# Patient Record
Sex: FEMALE | Race: OTHER | HISPANIC OR LATINO
[De-identification: names, ages, dates, MRNs, and addresses within clinical notes are randomized per-mention and may not be internally consistent; named-entity substitution may affect disease eponyms.]

---

## 2017-01-17 ENCOUNTER — APPOINTMENT (OUTPATIENT)
Dept: ULTRASOUND IMAGING | Facility: CLINIC | Age: 37
End: 2017-01-17

## 2017-01-17 ENCOUNTER — OUTPATIENT (OUTPATIENT)
Dept: OUTPATIENT SERVICES | Facility: HOSPITAL | Age: 37
LOS: 1 days | End: 2017-01-17
Payer: COMMERCIAL

## 2017-01-17 DIAGNOSIS — N91.5 OLIGOMENORRHEA, UNSPECIFIED: ICD-10-CM

## 2017-01-17 PROCEDURE — 76856 US EXAM PELVIC COMPLETE: CPT

## 2017-01-17 PROCEDURE — 76830 TRANSVAGINAL US NON-OB: CPT

## 2017-02-03 ENCOUNTER — APPOINTMENT (OUTPATIENT)
Dept: PLASTIC SURGERY | Facility: CLINIC | Age: 37
End: 2017-02-03

## 2017-02-03 DIAGNOSIS — M65.4 RADIAL STYLOID TENOSYNOVITIS [DE QUERVAIN]: ICD-10-CM

## 2017-04-12 PROBLEM — M65.4 TENOSYNOVITIS, DE QUERVAIN: Status: ACTIVE | Noted: 2017-04-12

## 2017-06-14 ENCOUNTER — RESULT REVIEW (OUTPATIENT)
Age: 37
End: 2017-06-14

## 2018-03-13 ENCOUNTER — APPOINTMENT (OUTPATIENT)
Dept: HUMAN REPRODUCTION | Facility: CLINIC | Age: 38
End: 2018-03-13
Payer: COMMERCIAL

## 2018-03-13 PROCEDURE — 76830 TRANSVAGINAL US NON-OB: CPT

## 2018-03-13 PROCEDURE — 36415 COLL VENOUS BLD VENIPUNCTURE: CPT

## 2018-03-13 PROCEDURE — 99204 OFFICE O/P NEW MOD 45 MIN: CPT

## 2018-04-12 ENCOUNTER — APPOINTMENT (OUTPATIENT)
Dept: HUMAN REPRODUCTION | Facility: CLINIC | Age: 38
End: 2018-04-12
Payer: COMMERCIAL

## 2018-04-12 PROCEDURE — 99214 OFFICE O/P EST MOD 30 MIN: CPT

## 2018-04-12 PROCEDURE — 36415 COLL VENOUS BLD VENIPUNCTURE: CPT

## 2018-05-03 ENCOUNTER — APPOINTMENT (OUTPATIENT)
Dept: HUMAN REPRODUCTION | Facility: CLINIC | Age: 38
End: 2018-05-03
Payer: COMMERCIAL

## 2018-05-03 PROCEDURE — 99215 OFFICE O/P EST HI 40 MIN: CPT

## 2020-02-14 ENCOUNTER — APPOINTMENT (OUTPATIENT)
Dept: ORTHOPEDIC SURGERY | Facility: CLINIC | Age: 40
End: 2020-02-14
Payer: COMMERCIAL

## 2020-02-14 VITALS
DIASTOLIC BLOOD PRESSURE: 79 MMHG | SYSTOLIC BLOOD PRESSURE: 114 MMHG | HEIGHT: 63 IN | WEIGHT: 150 LBS | HEART RATE: 71 BPM | BODY MASS INDEX: 26.58 KG/M2

## 2020-02-14 DIAGNOSIS — G56.02 CARPAL TUNNEL SYNDROME, LEFT UPPER LIMB: ICD-10-CM

## 2020-02-14 DIAGNOSIS — Z86.39 PERSONAL HISTORY OF OTHER ENDOCRINE, NUTRITIONAL AND METABOLIC DISEASE: ICD-10-CM

## 2020-02-14 DIAGNOSIS — Z87.09 PERSONAL HISTORY OF OTHER DISEASES OF THE RESPIRATORY SYSTEM: ICD-10-CM

## 2020-02-14 DIAGNOSIS — G56.01 CARPAL TUNNEL SYNDROME, RIGHT UPPER LIMB: ICD-10-CM

## 2020-02-14 PROCEDURE — 99203 OFFICE O/P NEW LOW 30 MIN: CPT

## 2020-02-14 RX ORDER — THYROID, PORCINE 15 MG/1
15 TABLET ORAL
Qty: 30 | Refills: 0 | Status: DISCONTINUED | COMMUNITY
Start: 2017-02-01 | End: 2020-02-14

## 2020-02-14 RX ORDER — MEDROXYPROGESTERONE ACETATE 10 MG/1
10 TABLET ORAL
Qty: 10 | Refills: 0 | Status: DISCONTINUED | COMMUNITY
Start: 2017-01-10 | End: 2020-02-14

## 2020-02-14 NOTE — HISTORY OF PRESENT ILLNESS
[Right] : right hand dominant [FreeTextEntry1] : She comes in today for evaluation of bilateral wrist to elbow pain. She is more symptomatic on the right over the left. She states that her bilateral pain began 1 year ago. She states that the pain is intermittent and 7/10. She notes numbness and tingling. She also notes that she is more symptomatic at night. She has had 2 cortisone injections done after her pregnancy in 2016 for de Quervain's tendinitis. She states no known injury. As for the left, she notes that her pain is 4/10 with numbness and tingling. She has had no injections on the left and no known injury. \par \par

## 2020-02-14 NOTE — ADDENDUM
[FreeTextEntry1] : I, Maryam Moreland, acted solely as a scribe for Dr. Ramesh Melgoza on 02/14/2020 . \par \par All medical record entries made by the Scribe were at my, Dr. Ramesh Melgoza, direction and personally dictated by me on 02/14/2020. I have personally reviewed the chart and agree that the record accurately reflects my personal performance of the history, physical exam, assessment and plan.

## 2020-02-14 NOTE — PHYSICAL EXAM
[de-identified] : - Constitutional: This is a female in no obvious distress.  \par - Psych: Patient is alert and oriented to person, place and time.  Patient has a normal mood and affect.\par - Cardiovascular: Normal pulses throughout the upper extremities.  No significant varicosities are noted in the upper extremities. \par - Neuro: Strength and sensation are intact throughout the upper extremities.  Patient has normal coordination.\par - Respiratory:  Patient exhibits no evidence of shortness of breath or difficulty breathing.\par - Skin: No rashes, lesions, or other abnormalities are noted in the upper extremities.\par \par ---\par \par Examination of her right wrist and hand demonstrates no swelling or tenderness along the first dorsal compartment.  There is a negative Finkelstein sign.  Provocative signs for carpal tunnel syndrome are positive.  There is no evidence of a trigger finger.  She has normal strength and sensation distally along the radial, ulnar and median nerve distributions.

## 2020-02-14 NOTE — DISCUSSION/SUMMARY
[FreeTextEntry1] : She has findings consistent with right greater than left carpal tunnel syndrome. \par \par I had a discussion regarding today's visit, the diagnosis, and treatment recommendations /  At this time, I recommended that she continue to wear the brace for support, especially at night.  I have ordered EMGs and she will follow-up after the study to review the results and discuss treatment recommendations.\par \par The patient has agreed to this plan of management and has expressed full understanding.  All questions were fully answered to the patient's satisfaction.\par \par Over 50% of the time spent with the patient was on counseling the patient on the above diagnosis, treatment plan and prognosis.

## 2020-02-20 ENCOUNTER — APPOINTMENT (OUTPATIENT)
Dept: PLASTIC SURGERY | Facility: CLINIC | Age: 40
End: 2020-02-20

## 2020-12-27 ENCOUNTER — TRANSCRIPTION ENCOUNTER (OUTPATIENT)
Age: 40
End: 2020-12-27

## 2021-05-24 ENCOUNTER — TRANSCRIPTION ENCOUNTER (OUTPATIENT)
Age: 41
End: 2021-05-24

## 2021-08-28 ENCOUNTER — TRANSCRIPTION ENCOUNTER (OUTPATIENT)
Age: 41
End: 2021-08-28

## 2021-09-01 ENCOUNTER — TRANSCRIPTION ENCOUNTER (OUTPATIENT)
Age: 41
End: 2021-09-01

## 2021-11-14 ENCOUNTER — TRANSCRIPTION ENCOUNTER (OUTPATIENT)
Age: 41
End: 2021-11-14

## 2021-11-16 ENCOUNTER — APPOINTMENT (OUTPATIENT)
Dept: DERMATOLOGY | Facility: CLINIC | Age: 41
End: 2021-11-16
Payer: COMMERCIAL

## 2021-11-16 ENCOUNTER — TRANSCRIPTION ENCOUNTER (OUTPATIENT)
Age: 41
End: 2021-11-16

## 2021-11-16 ENCOUNTER — LABORATORY RESULT (OUTPATIENT)
Age: 41
End: 2021-11-16

## 2021-11-16 VITALS — HEIGHT: 61 IN | BODY MASS INDEX: 26.43 KG/M2 | WEIGHT: 140 LBS

## 2021-11-16 DIAGNOSIS — R22.9 LOCALIZED SWELLING, MASS AND LUMP, UNSPECIFIED: ICD-10-CM

## 2021-11-16 DIAGNOSIS — D48.5 NEOPLASM OF UNCERTAIN BEHAVIOR OF SKIN: ICD-10-CM

## 2021-11-16 DIAGNOSIS — D49.2 NEOPLASM OF UNSPECIFIED BEHAVIOR OF BONE, SOFT TISSUE, AND SKIN: ICD-10-CM

## 2021-11-16 PROCEDURE — 99203 OFFICE O/P NEW LOW 30 MIN: CPT | Mod: 25

## 2021-11-16 PROCEDURE — 11104 PUNCH BX SKIN SINGLE LESION: CPT

## 2021-11-16 RX ORDER — CLOBETASOL PROPIONATE 0.5 MG/G
0.05 OINTMENT TOPICAL
Qty: 1 | Refills: 0 | Status: DISCONTINUED | COMMUNITY
Start: 2021-11-16 | End: 2021-11-16

## 2021-11-23 ENCOUNTER — RESULT REVIEW (OUTPATIENT)
Age: 41
End: 2021-11-23

## 2021-11-30 ENCOUNTER — APPOINTMENT (OUTPATIENT)
Dept: DERMATOLOGY | Facility: CLINIC | Age: 41
End: 2021-11-30
Payer: COMMERCIAL

## 2021-11-30 PROCEDURE — 99214 OFFICE O/P EST MOD 30 MIN: CPT

## 2021-12-01 ENCOUNTER — LABORATORY RESULT (OUTPATIENT)
Age: 41
End: 2021-12-01

## 2021-12-07 ENCOUNTER — NON-APPOINTMENT (OUTPATIENT)
Age: 41
End: 2021-12-07

## 2021-12-20 ENCOUNTER — OUTPATIENT (OUTPATIENT)
Dept: OUTPATIENT SERVICES | Facility: HOSPITAL | Age: 41
LOS: 1 days | Discharge: HOME | End: 2021-12-20

## 2021-12-20 DIAGNOSIS — C85.90 NON-HODGKIN LYMPHOMA, UNSPECIFIED, UNSPECIFIED SITE: ICD-10-CM

## 2021-12-21 ENCOUNTER — APPOINTMENT (OUTPATIENT)
Dept: HEMATOLOGY ONCOLOGY | Facility: CLINIC | Age: 41
End: 2021-12-21
Payer: COMMERCIAL

## 2021-12-21 ENCOUNTER — RESULT REVIEW (OUTPATIENT)
Age: 41
End: 2021-12-21

## 2021-12-21 ENCOUNTER — NON-APPOINTMENT (OUTPATIENT)
Age: 41
End: 2021-12-21

## 2021-12-21 VITALS
WEIGHT: 143.96 LBS | TEMPERATURE: 98.6 F | OXYGEN SATURATION: 99 % | RESPIRATION RATE: 16 BRPM | DIASTOLIC BLOOD PRESSURE: 80 MMHG | SYSTOLIC BLOOD PRESSURE: 109 MMHG | BODY MASS INDEX: 27.18 KG/M2 | HEIGHT: 61 IN | HEART RATE: 73 BPM

## 2021-12-21 DIAGNOSIS — Z78.9 OTHER SPECIFIED HEALTH STATUS: ICD-10-CM

## 2021-12-21 DIAGNOSIS — J45.20 MILD INTERMITTENT ASTHMA, UNCOMPLICATED: ICD-10-CM

## 2021-12-21 DIAGNOSIS — Z82.69 FAMILY HISTORY OF OTHER DISEASES OF THE MUSCULOSKELETAL SYSTEM AND CONNECTIVE TISSUE: ICD-10-CM

## 2021-12-21 DIAGNOSIS — C88.4 EXTRANODAL MARGINAL ZONE B-CELL LYMPHOMA OF MUCOSA-ASSOCIATED LYMPHOID TISSUE [MALT-LYMPHOMA]: ICD-10-CM

## 2021-12-21 DIAGNOSIS — E78.5 HYPERLIPIDEMIA, UNSPECIFIED: ICD-10-CM

## 2021-12-21 LAB
BASOPHILS # BLD AUTO: 0.08 K/UL — SIGNIFICANT CHANGE UP (ref 0–0.2)
BASOPHILS NFR BLD AUTO: 1.5 % — SIGNIFICANT CHANGE UP (ref 0–2)
EOSINOPHIL # BLD AUTO: 0.18 K/UL — SIGNIFICANT CHANGE UP (ref 0–0.5)
EOSINOPHIL NFR BLD AUTO: 3.4 % — SIGNIFICANT CHANGE UP (ref 0–6)
HCT VFR BLD CALC: 36.9 % — SIGNIFICANT CHANGE UP (ref 34.5–45)
HGB BLD-MCNC: 12.7 G/DL — SIGNIFICANT CHANGE UP (ref 11.5–15.5)
IMM GRANULOCYTES NFR BLD AUTO: 0.2 % — SIGNIFICANT CHANGE UP (ref 0–1.5)
LYMPHOCYTES # BLD AUTO: 2.51 K/UL — SIGNIFICANT CHANGE UP (ref 1–3.3)
LYMPHOCYTES # BLD AUTO: 46.7 % — HIGH (ref 13–44)
MCHC RBC-ENTMCNC: 30.4 PG — SIGNIFICANT CHANGE UP (ref 27–34)
MCHC RBC-ENTMCNC: 34.4 G/DL — SIGNIFICANT CHANGE UP (ref 32–36)
MCV RBC AUTO: 88.3 FL — SIGNIFICANT CHANGE UP (ref 80–100)
MONOCYTES # BLD AUTO: 0.36 K/UL — SIGNIFICANT CHANGE UP (ref 0–0.9)
MONOCYTES NFR BLD AUTO: 6.7 % — SIGNIFICANT CHANGE UP (ref 2–14)
NEUTROPHILS # BLD AUTO: 2.23 K/UL — SIGNIFICANT CHANGE UP (ref 1.8–7.4)
NEUTROPHILS NFR BLD AUTO: 41.5 % — LOW (ref 43–77)
NRBC # BLD: 0 /100 WBCS — SIGNIFICANT CHANGE UP (ref 0–0)
PLATELET # BLD AUTO: 183 K/UL — SIGNIFICANT CHANGE UP (ref 150–400)
RBC # BLD: 4.18 M/UL — SIGNIFICANT CHANGE UP (ref 3.8–5.2)
RBC # FLD: 12.7 % — SIGNIFICANT CHANGE UP (ref 10.3–14.5)
WBC # BLD: 5.37 K/UL — SIGNIFICANT CHANGE UP (ref 3.8–10.5)
WBC # FLD AUTO: 5.37 K/UL — SIGNIFICANT CHANGE UP (ref 3.8–10.5)

## 2021-12-21 PROCEDURE — 99245 OFF/OP CONSLTJ NEW/EST HI 55: CPT

## 2021-12-25 RX ORDER — FLUTICASONE FUROATE AND VILANTEROL TRIFENATATE 100; 25 UG/1; UG/1
100-25 POWDER RESPIRATORY (INHALATION)
Qty: 60 | Refills: 0 | Status: ACTIVE | COMMUNITY
Start: 2020-10-21

## 2021-12-25 RX ORDER — DESOGESTREL AND ETHINYL ESTRADIOL 0.15-0.03
0.15-3 KIT ORAL
Qty: 28 | Refills: 0 | Status: ACTIVE | COMMUNITY
Start: 2021-12-13

## 2021-12-25 NOTE — PHYSICAL EXAM
[Fully active, able to carry on all pre-disease performance without restriction] : Status 0 - Fully active, able to carry on all pre-disease performance without restriction [Normal] : affect appropriate [de-identified] : Darker, raised, oval nodule, less than 1 cm in diameter on left upper back, notched at site of punch biopsy

## 2021-12-25 NOTE — REVIEW OF SYSTEMS
[SOB on Exertion] : shortness of breath during exertion [Negative] : Allergic/Immunologic [Cough] : no cough [FreeTextEntry6] : Asthma [FreeTextEntry8] : Premature menopause, as above [de-identified] : as above

## 2021-12-25 NOTE — ASSESSMENT
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  The lesion, by history, has partially regressed over time.\par She has been appropriately referred to have the lesion fully excised.\par Extent of disease evaluation will be done to include a PET/CT scan and blood work.\par She has already had blood work which included a normal CMP (minimal ALT elevation), LDH and normal SPEP, negative IgG and IgM Lyme immunoblot results.\par Viral serologies were drawn today along with a beta-2 microglobulin and immunoelectrophoresis.\par CBC today showed a WBC of 5.37, Hgb 12.7, PLT 183K,  lymphocyte percentage was slightly higher than neutrophil percentage while the absolute counts were normal.  Because of this, peripheral blood flow cytometry will be checked.  If this work-up is negative along with the PET/CT scan, the planned excision will serve as her definitive therapy.  She will then need regular dermatology follow-up and should return for follow-up here in about 6 months.\par She had an uncomplicated course of Covid earlier this year.  She declines to be vaccinated.  Covid spike antigen antibody will be checked. [Palliative Care Plan] : not applicable at this time

## 2021-12-25 NOTE — HISTORY OF PRESENT ILLNESS
[Disease:__________________________] : Disease: [unfilled] [de-identified] : Ms. Long is a 40 yo woman with recently diagnosed cutaneous marginal zone lymphoma.  She has a history of asthma, hyperlipidemia and premature menopause.  About 6 months ago, she noticed a bump on her left upper back. Biopsy showed marginal zone lymphoma.  \par She then saw Dr. Cl Maria and a second biopsy confirmed the diagnosis; there was insufficient tissue for Ig gene rearrangement  but prior bx \par showed clonal IgH rearrangements, neg for TCR rearr.  Kappa+ plasma cells were seen; Ki-67 was about 10%.\par She says that the lump on her back has regressed somewhat since she first felt it.  She has no other suspicious skin findings.\par She has had no constitutional symptoms.  [de-identified] : ? IE [de-identified] : initial visit

## 2021-12-27 DIAGNOSIS — C85.80 OTHER SPECIFIED TYPES OF NON-HODGKIN LYMPHOMA, UNSPECIFIED SITE: ICD-10-CM

## 2021-12-29 LAB
B2 MICROGLOB SERPL-MCNC: 1.1 MG/L
COVID-19 SPIKE DOMAIN ANTIBODY INTERPRETATION: POSITIVE
HBV CORE IGG+IGM SER QL: NONREACTIVE
HBV SURFACE AB SER QL: NONREACTIVE
HBV SURFACE AG SER QL: NONREACTIVE
HCV AB SER QL: NONREACTIVE
HCV S/CO RATIO: 0.08 S/CO
HIV1+2 AB SPEC QL IA.RAPID: NONREACTIVE
SARS-COV-2 AB SERPL IA-ACNC: >250 U/ML

## 2022-01-21 ENCOUNTER — APPOINTMENT (OUTPATIENT)
Dept: DERMATOLOGY | Facility: CLINIC | Age: 42
End: 2022-01-21

## 2022-01-23 ENCOUNTER — TRANSCRIPTION ENCOUNTER (OUTPATIENT)
Age: 42
End: 2022-01-23

## 2022-04-06 LAB
ALBUMIN MFR SERPL ELPH: 59.7 %
ALBUMIN SERPL-MCNC: 4.1 G/DL
ALBUMIN/GLOB SERPL: 1.5 RATIO
ALPHA1 GLOB MFR SERPL ELPH: 4.1 %
ALPHA1 GLOB SERPL ELPH-MCNC: 0.3 G/DL
ALPHA2 GLOB MFR SERPL ELPH: 10.5 %
ALPHA2 GLOB SERPL ELPH-MCNC: 0.7 G/DL
B-GLOBULIN MFR SERPL ELPH: 12.3 %
B-GLOBULIN SERPL ELPH-MCNC: 0.8 G/DL
DEPRECATED KAPPA LC FREE/LAMBDA SER: 1.12 RATIO
GAMMA GLOB FLD ELPH-MCNC: 0.9 G/DL
GAMMA GLOB MFR SERPL ELPH: 13.4 %
IGA SER QL IEP: 231 MG/DL
IGG SER QL IEP: 921 MG/DL
IGM SER QL IEP: 134 MG/DL
INTERPRETATION SERPL IEP-IMP: NORMAL
KAPPA LC CSF-MCNC: 0.94 MG/DL
KAPPA LC SERPL-MCNC: 1.05 MG/DL
M PROTEIN SPEC IFE-MCNC: NORMAL
PROT SERPL-MCNC: 6.8 G/DL
PROT SERPL-MCNC: 6.8 G/DL

## 2022-05-09 ENCOUNTER — NON-APPOINTMENT (OUTPATIENT)
Age: 42
End: 2022-05-09

## 2022-09-06 ENCOUNTER — NON-APPOINTMENT (OUTPATIENT)
Age: 42
End: 2022-09-06

## 2023-06-24 ENCOUNTER — NON-APPOINTMENT (OUTPATIENT)
Age: 43
End: 2023-06-24

## 2023-07-03 ENCOUNTER — APPOINTMENT (OUTPATIENT)
Dept: ENDOCRINOLOGY | Facility: CLINIC | Age: 43
End: 2023-07-03
Payer: COMMERCIAL

## 2023-07-03 VITALS
HEART RATE: 127 BPM | BODY MASS INDEX: 27.48 KG/M2 | HEIGHT: 60 IN | OXYGEN SATURATION: 98 % | DIASTOLIC BLOOD PRESSURE: 82 MMHG | WEIGHT: 140 LBS | SYSTOLIC BLOOD PRESSURE: 110 MMHG

## 2023-07-03 DIAGNOSIS — R79.89 OTHER SPECIFIED ABNORMAL FINDINGS OF BLOOD CHEMISTRY: ICD-10-CM

## 2023-07-03 DIAGNOSIS — E28.319 ASYMPTOMATIC PREMATURE MENOPAUSE: ICD-10-CM

## 2023-07-03 PROCEDURE — 99204 OFFICE O/P NEW MOD 45 MIN: CPT

## 2023-07-05 PROBLEM — E28.319 PREMATURE MENOPAUSE: Status: RESOLVED | Noted: 2021-12-21 | Resolved: 2023-07-05

## 2023-07-05 RX ORDER — ESTRADIOL 2 MG/1
2 TABLET ORAL
Qty: 60 | Refills: 0 | Status: DISCONTINUED | COMMUNITY
Start: 2021-03-22 | End: 2023-07-05

## 2023-07-05 RX ORDER — ROSUVASTATIN CALCIUM 10 MG/1
10 TABLET, FILM COATED ORAL DAILY
Refills: 0 | Status: DISCONTINUED | COMMUNITY
Start: 2021-12-21 | End: 2023-07-05

## 2023-07-05 NOTE — HISTORY OF PRESENT ILLNESS
[FreeTextEntry1] : 43 yo F with PMH of cutaneous marginal zone lymphoma, asthma, POI here for endocrine evaluation.\par \par POI diagnosed at age 36. Was seeing [facility] previously was trying to have another baby, was told no eggs. Has not seen endocrinology before.\par One year ago was still seeing [facility]. No longer planning for further children.\par Prior hot flashes which have resolved.\par Gets vaginal dryness. \par No postpartum hemorrhage.  baby after birth for 2 months, did not produce much milk, menses resumed ~4-6 months after Never on ocp \par Nonsmoker\par No CAD, VTE, CVA  in self or family \par Prior labs not available for POI diagnosis.\par \par Current regimen:  none\par Previously was taking PO estradiol/progesterone until 1 year ago while trying to get pregnant.\par \par [place] born in [place]. No trouble conceiving him. .\par \par Menarche: 13\par Regular \par LMP 2016\par \par Has never had DXA. No hx of osteoporosis or fracture.\par \par current taking calcium/vitamin D.\par Not on any other meds at this time.\par \par Sister also with hx of POI around 36-38 yo (also has SLE)\par No other endocrine disorders in family.\par

## 2023-07-05 NOTE — ASSESSMENT
[FreeTextEntry1] : 41 yo F with PMH of POI here for endocrine evaluation.\par \par #secondary amenorrhea/premature ovarian insufficiency\par - initial labs not available from prior diagnosis. Will check baseline labs including LH/FSH/estradiol along with a full pituitary panel (TFTs, PRL)\par - check hcg \par - check fragile x, chromosomal analysis, adrenal cortex ab\par - following labs, will plan to start HRT with estradiol patches + progesterone monthly. Discussed that this is not a form of contraception should spontaneous ovulation occur\par - sister also with POI, will consider genetics referral\par - check DXA\par \par #vitamin D deficiency\par - check level\par - continue supplementation\par \par RTC 6 months

## 2023-07-23 ENCOUNTER — NON-APPOINTMENT (OUTPATIENT)
Age: 43
End: 2023-07-23

## 2023-08-24 ENCOUNTER — NON-APPOINTMENT (OUTPATIENT)
Age: 43
End: 2023-08-24

## 2023-08-25 ENCOUNTER — TRANSCRIPTION ENCOUNTER (OUTPATIENT)
Age: 43
End: 2023-08-25

## 2023-08-25 LAB
25(OH)D3 SERPL-MCNC: 32.5 NG/ML
ACTH-ESO: 9.1 PG/ML
ADRENAL AB SER-ACNC: NEGATIVE
CORTIS SERPL-MCNC: 8.4 UG/DL
DHEA-SULFATE, SERUM: 82 UG/DL
ESTRADIOL SERPL-MCNC: <5 PG/ML
FMR1 GENE MUT ANL BLD/T: NORMAL
FSH SERPL-MCNC: 90 IU/L
HCG SERPL-MCNC: 1 MIU/ML
IGF-1 INTERP: NORMAL
IGF-I BLD-MCNC: 180 NG/ML
LH SERPL-ACNC: 45.9 IU/L
MONOMERIC PROLACTIN (ICMA)*: 8.56 NG/ML
OVARY AB SER QL IF: NEGATIVE
PERCENT MACROPROLACTIN: 19 %
PROLACTIN SERPL-MCNC: 9.7 NG/ML
PROLACTIN, SERUM (ICMA)*: 10.6 NG/ML
T4 FREE SERPL-MCNC: 1.4 NG/DL
THYROGLOB AB SERPL-ACNC: <20 IU/ML
THYROPEROXIDASE AB SERPL IA-ACNC: <10 IU/ML
TSH SERPL-ACNC: 0.8 UIU/ML

## 2024-02-28 ENCOUNTER — APPOINTMENT (OUTPATIENT)
Dept: ENDOCRINOLOGY | Facility: CLINIC | Age: 44
End: 2024-02-28

## 2024-02-28 ENCOUNTER — APPOINTMENT (OUTPATIENT)
Dept: ENDOCRINOLOGY | Facility: CLINIC | Age: 44
End: 2024-02-28
Payer: COMMERCIAL

## 2024-02-28 VITALS — WEIGHT: 161 LBS | BODY MASS INDEX: 28.89 KG/M2 | HEIGHT: 62.5 IN

## 2024-02-28 VITALS — OXYGEN SATURATION: 97 % | HEART RATE: 100 BPM | DIASTOLIC BLOOD PRESSURE: 70 MMHG | SYSTOLIC BLOOD PRESSURE: 100 MMHG

## 2024-02-28 PROCEDURE — 99214 OFFICE O/P EST MOD 30 MIN: CPT

## 2024-02-28 PROCEDURE — 77085 DXA BONE DENSITY AXL VRT FX: CPT

## 2024-02-28 PROCEDURE — ZZZZZ: CPT

## 2024-02-28 RX ORDER — PROGESTERONE 200 MG/1
200 CAPSULE ORAL
Qty: 36 | Refills: 2 | Status: ACTIVE | COMMUNITY
Start: 2023-07-19 | End: 1900-01-01

## 2024-02-28 RX ORDER — ESTRADIOL 0.1 MG/D
0.1 PATCH, EXTENDED RELEASE TRANSDERMAL
Qty: 24 | Refills: 2 | Status: ACTIVE | COMMUNITY
Start: 2023-07-19 | End: 1900-01-01

## 2024-02-28 NOTE — HISTORY OF PRESENT ILLNESS
[FreeTextEntry1] : 42 yo F with PMH of cutaneous marginal zone lymphoma, asthma, POI here for endocrine evaluation.  POI diagnosed at age 36. Was seeing [facility] previously was trying to have another baby, was told no eggs. Has not seen endocrinology before. One year ago was still seeing [facility]. No longer planning for further children. Not on contraceptives. Prior hot flashes which have resolved. Gets vaginal dryness. Reports vaginal dryness is better since starting HRT. No postpartum hemorrhage.  baby after birth for 2 months, did not produce much milk, menses resumed ~4-6 months after Never on ocp  Nonsmoker No CAD, VTE, CVA  in self or family  Prior labs not available for POI diagnosis. Recent labs (2023) show low estradiol and high FSH/LH, c/w POI.  Current regimen: Estradiol 0.1 mg/24 hr patch twice weekly + progesterone 200 mg daily for first 12 days of each month (started this regimen in 2023). Tolerating well. Previously was taking PO estradiol/progesterone until 1 year ago while trying to get pregnant.  Son born in [place]. No trouble conceiving him. .  Menarche: 13 Regular  LMP 2016 Reports since starting E2 patch + progesterone, has been getting monthly periods. LMP 2024.  No prior DXA. No hx of osteoporosis or fracture. Baseline DXA today (2024): LS -0.6, total hip 0.3, fem neck 0.1, prox radius -0.1.   Currently taking calcium/vitamin D daily. Also on rosuvastatin 10 mg daily for HLD.  Sister also with hx of POI around 36-38 yo (also has SLE).  No other endocrine disorders in family.

## 2024-02-28 NOTE — ASSESSMENT
[FreeTextEntry1] : 42 yo F with PMH of POI here for endocrine evaluation.  #secondary amenorrhea/premature ovarian insufficiency - initial labs not available from prior diagnosis.  - 7/2023 labs show low estradiol and high FSH/LH, c/w POI. Rest of pituitary panel and adrenal cortex ab wnl. - Continue transdermal E2 0.1 mg patch changed twice a week and progesterone 200mg x 12 days/month (most likely until around age 50). Discussed that this is not a form of contraception should spontaneous ovulation occur. - fragile x testing --> grey zone/indeterminate, though it is not the genetic basis of your premature ovarian insufficiency per prior chart notes. Refer to genetics (along with sister) per Dr. Lemons, phone number for genetics given again today. -Baseline DXA today (2/28/2024): LS -0.6, total hip 0.3, fem neck 0.1, prox radius -0.1. Normal DXA.   #vitamin D deficiency - continue supplementation  Recent outside labs reviewed today. Copy emailed to [email or web address]. RTC 6 months with Dr. Lemons.  Beatriz Del Real "Luigi Reyes NP

## 2024-02-28 NOTE — PHYSICAL EXAM
[Alert] : alert [Well Nourished] : well nourished [No Acute Distress] : no acute distress [Well Developed] : well developed [Normal Sclera/Conjunctiva] : normal sclera/conjunctiva [EOMI] : extra ocular movement intact [No Proptosis] : no proptosis [Thyroid Not Enlarged] : the thyroid was not enlarged [No Thyroid Nodules] : no palpable thyroid nodules [No Respiratory Distress] : no respiratory distress [No Accessory Muscle Use] : no accessory muscle use [Clear to Auscultation] : lungs were clear to auscultation bilaterally [Normal S1, S2] : normal S1 and S2 [Normal Rate] : heart rate was normal [Regular Rhythm] : with a regular rhythm [No Edema] : no peripheral edema [Pedal Pulses Normal] : the pedal pulses are present [Normal Bowel Sounds] : normal bowel sounds [Not Tender] : non-tender [Not Distended] : not distended [Soft] : abdomen soft [Normal Anterior Cervical Nodes] : no anterior cervical lymphadenopathy [No Spinal Tenderness] : no spinal tenderness [Spine Straight] : spine straight [No Stigmata of Cushings Syndrome] : no stigmata of Cushings Syndrome [Normal Gait] : normal gait [Normal Strength/Tone] : muscle strength and tone were normal [No Rash] : no rash [Normal Reflexes] : deep tendon reflexes were 2+ and symmetric [No Tremors] : no tremors [Oriented x3] : oriented to person, place, and time [Acanthosis Nigricans] : no acanthosis nigricans

## 2024-03-07 ENCOUNTER — APPOINTMENT (OUTPATIENT)
Age: 44
End: 2024-03-07

## 2024-04-20 ENCOUNTER — NON-APPOINTMENT (OUTPATIENT)
Age: 44
End: 2024-04-20

## 2024-05-23 ENCOUNTER — APPOINTMENT (OUTPATIENT)
Dept: PEDIATRIC MEDICAL GENETICS | Facility: TELEHEALTH | Age: 44
End: 2024-05-23
Payer: COMMERCIAL

## 2024-05-23 ENCOUNTER — APPOINTMENT (OUTPATIENT)
Dept: PEDIATRIC MEDICAL GENETICS | Facility: TELEHEALTH | Age: 44
End: 2024-05-23

## 2024-05-23 ENCOUNTER — APPOINTMENT (OUTPATIENT)
Age: 44
End: 2024-05-23

## 2024-05-23 DIAGNOSIS — Z83.49 FAMILY HISTORY OF OTHER ENDOCRINE, NUTRITIONAL AND METABOLIC DISEASES: ICD-10-CM

## 2024-05-23 DIAGNOSIS — E28.39 OTHER PRIMARY OVARIAN FAILURE: ICD-10-CM

## 2024-05-23 DIAGNOSIS — N91.1 SECONDARY AMENORRHEA: ICD-10-CM

## 2024-05-23 PROCEDURE — 99205 OFFICE O/P NEW HI 60 MIN: CPT | Mod: 95

## 2024-05-28 NOTE — CONSULT LETTER
[Dear  ___] : Dear  [unfilled], [Consult Letter:] : I had the pleasure of evaluating your patient, [unfilled]. [Please see my note below.] : Please see my note below. [Consult Closing:] : Thank you very much for allowing me to participate in the care of this patient.  If you have any questions, please do not hesitate to contact me. [Sincerely,] : Sincerely, [FreeTextEntry3] :  Ashley Gutierrez MD, PhD Clinical  [facility], Division of Medical Genetics and Human Genomics

## 2024-05-28 NOTE — FAMILY HISTORY
[FreeTextEntry1] : Mrs. Long is of [place] descent.  Her sister also had premature ovarian failure, going into menopause between ages 36-38. Her sister has two healthy daughters, ages 16 and 18. Her mother is in good health and was menopausal in her 50's.

## 2024-05-28 NOTE — HISTORY OF PRESENT ILLNESS
[FreeTextEntry1] : Mrs Regan has been in excellent health, without hospitalization or surgeries. Menarche was at age 13 and she had normal periods until she became pregnant in 2015. Pregnancy was conceived naturally and she has a healthy 8 year old son.  Her periods resumed after her son was born, but they stopped suddenly in 2016. She was told that she had no viable eggs and was in menopause. Mom went through menopause at age 51.  Prior genetic testing included a normal female karyotype and a fragile X analysis revealing repeat lengths of 20 and 46 repeats.

## 2024-05-28 NOTE — REASON FOR VISIT
[Other Location: e.g. School (Enter Location, City,State)___] : at [unfilled], at the time of the visit. [Other Location: e.g. Home (Enter Location, City,State)___] : at [unfilled] [FreeTextEntry3] : LAVONNE OLIVER is an 43 year old female, being evaluated for primary ovarian failure (POF). Genetic counselor, Sunshine Briggs, was present for the evaluation on 05/23/2024.

## 2024-06-23 ENCOUNTER — NON-APPOINTMENT (OUTPATIENT)
Age: 44
End: 2024-06-23

## 2024-07-03 ENCOUNTER — NON-APPOINTMENT (OUTPATIENT)
Age: 44
End: 2024-07-03

## 2024-07-04 ENCOUNTER — RX RENEWAL (OUTPATIENT)
Age: 44
End: 2024-07-04

## 2024-09-06 ENCOUNTER — NON-APPOINTMENT (OUTPATIENT)
Age: 44
End: 2024-09-06

## 2024-10-01 ENCOUNTER — APPOINTMENT (OUTPATIENT)
Dept: ENDOCRINOLOGY | Facility: CLINIC | Age: 44
End: 2024-10-01
Payer: COMMERCIAL

## 2024-10-01 VITALS
HEIGHT: 62.5 IN | DIASTOLIC BLOOD PRESSURE: 76 MMHG | SYSTOLIC BLOOD PRESSURE: 106 MMHG | WEIGHT: 154 LBS | HEART RATE: 84 BPM | OXYGEN SATURATION: 96 % | BODY MASS INDEX: 27.63 KG/M2

## 2024-10-01 DIAGNOSIS — R79.89 OTHER SPECIFIED ABNORMAL FINDINGS OF BLOOD CHEMISTRY: ICD-10-CM

## 2024-10-01 DIAGNOSIS — E28.39 OTHER PRIMARY OVARIAN FAILURE: ICD-10-CM

## 2024-10-01 PROCEDURE — G2211 COMPLEX E/M VISIT ADD ON: CPT | Mod: NC

## 2024-10-01 PROCEDURE — 99215 OFFICE O/P EST HI 40 MIN: CPT

## 2024-10-02 LAB
25(OH)D3 SERPL-MCNC: 26.6 NG/ML
ANTI-MUELLERIAN HORMONE: 0.03 NG/ML
CHOLEST SERPL-MCNC: 181 MG/DL
HDLC SERPL-MCNC: 73 MG/DL
LDLC SERPL CALC-MCNC: 73 MG/DL
NONHDLC SERPL-MCNC: 108 MG/DL
TRIGL SERPL-MCNC: 215 MG/DL

## 2024-10-03 ENCOUNTER — NON-APPOINTMENT (OUTPATIENT)
Age: 44
End: 2024-10-03

## 2024-10-03 ENCOUNTER — TRANSCRIPTION ENCOUNTER (OUTPATIENT)
Age: 44
End: 2024-10-03

## 2024-10-03 RX ORDER — MULTIVIT-MIN/IRON/FOLIC ACID/K 18-600-40
50 MCG CAPSULE ORAL
Qty: 90 | Refills: 1 | Status: ACTIVE | COMMUNITY
Start: 2024-10-03 | End: 1900-01-01

## 2024-10-03 NOTE — HISTORY OF PRESENT ILLNESS
[FreeTextEntry1] : 44 yo F with PMH of cutaneous marginal zone lymphoma s/p local RT, asthma, POI here for follow up.  POI diagnosed at age 36. Was seeing [facility] previously was trying to have another baby, was told no eggs.  [facility]   Prior hot flashes which have resolved. Was getting vaginal dryness.  No postpartum hemorrhage.  baby after birth for 2 months, did not produce much milk, menses resumed ~4-6 months after Never on ocp previously  Nonsmoker No CAD, VTE, CVA  in self or family  Prior labs not available for POI diagnosis.  primary cutaneous marginal zone lymphoma, completed local RT at [facility]  Previously was taking PO estradiol/progesterone until 2 year ago while trying to get pregnant.  Current regimen:   Estradiol 0.1 mg patch twice weekly + progesterone 200 mg x 12 days/month since 2023 menses resumed on this regimen; LMP 2024  Son born in [place]. No trouble conceiving him. .  Menarche: 13 Regular prior to POI LMP 2016 prior to therapy  On HRT menses resumed   No hx of osteoporosis or fracture.  current taking calcium/vitamin D. Not on any other meds at this time.  Sister also with hx of POI around 36-36 yo (also has SLE) No other endocrine disorders in family.  2023  pit panel wnl low estradiol, high FSH/LH c/w POI adrenal ab negative fragile x testing --> grey zone/indeterminate --> referred to genetics   2023 saw onc again due to concern for new skin lesion. She is asking about being on HRT given onc history. Reviewed again the risks of VTE from estrogen. which may be increased in malignancy in general, and that with a cutaneous malignancy (no systemic disease), her risk might be lower but she should discuss with her oncologist.  2024 saw genetics - "negative result on the Premature Ovarian Panel. There were no variants found in any of the 22 genes studied. We were not able to find an underlying genetic explanation for her POF."

## 2024-10-03 NOTE — ASSESSMENT
[FreeTextEntry1] : 42 yo F with PMH of cutaneous marginal zone lymphoma s/p local RT, asthma, POI here for follow up.  #secondary amenorrhea/premature ovarian insufficiency - initial labs not available from prior diagnosis.  -HRT with estradiol patches + progesterone monthly since 8/2023. We have discussed that this is not a form of contraception should spontaneous ovulation occur although the likelihood at this point is very low -fragile x testing --> grey zone/indeterminate -saw genetics 5/2024: negative result on the Premature Ovarian Panel. There were no variants found in any of the 22 genes studied. We were not able to find an underlying genetic explanation for her POF.  #infertility - today pt expressing hope for pregnancy, which was not expressed at initial visit. Discussed in detail that it can be confusing since she is getting menses on HRT but spontaneous ovulation is unfortunately unlikely based on her prior history. She has seen [facility] before outside of [facility] (I do not have these records to review). Discussed that we can check an AMH to assess ovarian reserve and she can consider another [facility] opinion. She does not think she would consider donor eggs.  #HLD on crestor 10 mg - from pcp  #vitamin D deficiency - check level - continue supplementation  RTC 6 months

## 2024-10-03 NOTE — ASSESSMENT
[FreeTextEntry1] : 44 yo F with PMH of cutaneous marginal zone lymphoma s/p local RT, asthma, POI here for follow up.  #secondary amenorrhea/premature ovarian insufficiency - initial labs not available from prior diagnosis.  -HRT with estradiol patches + progesterone monthly since 8/2023. We have discussed that this is not a form of contraception should spontaneous ovulation occur although the likelihood at this point is very low -fragile x testing --> grey zone/indeterminate -saw genetics 5/2024: negative result on the Premature Ovarian Panel. There were no variants found in any of the 22 genes studied. We were not able to find an underlying genetic explanation for her POF.  #infertility - today pt expressing hope for pregnancy, which was not expressed at initial visit. Discussed in detail that it can be confusing since she is getting menses on HRT but spontaneous ovulation is unfortunately unlikely based on her prior history. She has seen [facility] before outside of [facility] (I do not have these records to review). Discussed that we can check an AMH to assess ovarian reserve and she can consider another [facility] opinion. She does not think she would consider donor eggs.  #HLD on crestor 10 mg - from pcp  #vitamin D deficiency - check level - continue supplementation  RTC 6 months

## 2024-10-03 NOTE — HISTORY OF PRESENT ILLNESS
[FreeTextEntry1] : 44 yo F with PMH of cutaneous marginal zone lymphoma s/p local RT, asthma, POI here for follow up.  POI diagnosed at age 36. Was seeing [facility] previously was trying to have another baby, was told no eggs.  One [facility]   Prior hot flashes which have resolved. Was getting vaginal dryness.  No postpartum hemorrhage.  baby after birth for 2 months, did not produce much milk, menses resumed ~4-6 months after Never on ocp previously  Nonsmoker No CAD, VTE, CVA  in self or family  Prior labs not available for POI diagnosis.  primary cutaneous marginal zone lymphoma, completed local RT at [facility]  Previously was taking PO estradiol/progesterone until 2 year ago while trying to get pregnant.  Current regimen:   Estradiol 0.1 mg patch twice weekly + progesterone 200 mg x 12 days/month since 2023 menses resumed on this regimen; LMP 2024  Son born in [place]. No trouble conceiving him. .  Menarche: 13 Regular prior to POI LMP 2016 prior to therapy  On HRT menses resumed   No hx of osteoporosis or fracture.  current taking calcium/vitamin D. Not on any other meds at this time.  Sister also with hx of POI around 36-38 yo (also has SLE) No other endocrine disorders in family.  2023  pit panel wnl low estradiol, high FSH/LH c/w POI adrenal ab negative fragile x testing --> grey zone/indeterminate --> referred to genetics   2023 saw onc again due to concern for new skin lesion. She is asking about being on HRT given onc history. Reviewed again the risks of VTE from estrogen. which may be increased in malignancy in general, and that with a cutaneous malignancy (no systemic disease), her risk might be lower but she should discuss with her oncologist.  2024 saw genetics - "negative result on the Premature Ovarian Panel. There were no variants found in any of the 22 genes studied. We were not able to find an underlying genetic explanation for her POF."

## 2024-10-04 ENCOUNTER — NON-APPOINTMENT (OUTPATIENT)
Age: 44
End: 2024-10-04

## 2024-12-19 ENCOUNTER — NON-APPOINTMENT (OUTPATIENT)
Age: 44
End: 2024-12-19

## 2025-01-05 ENCOUNTER — NON-APPOINTMENT (OUTPATIENT)
Age: 45
End: 2025-01-05

## 2025-07-15 ENCOUNTER — APPOINTMENT (OUTPATIENT)
Dept: ENDOCRINOLOGY | Facility: CLINIC | Age: 45
End: 2025-07-15
Payer: COMMERCIAL

## 2025-07-15 VITALS
RESPIRATION RATE: 16 BRPM | SYSTOLIC BLOOD PRESSURE: 116 MMHG | BODY MASS INDEX: 30.42 KG/M2 | HEART RATE: 76 BPM | WEIGHT: 157 LBS | DIASTOLIC BLOOD PRESSURE: 76 MMHG | OXYGEN SATURATION: 98 % | HEIGHT: 60.2 IN

## 2025-07-15 PROBLEM — Z13.1 SCREENING FOR DIABETES MELLITUS: Status: ACTIVE | Noted: 2025-07-15

## 2025-07-15 PROCEDURE — G2211 COMPLEX E/M VISIT ADD ON: CPT | Mod: NC

## 2025-07-15 PROCEDURE — 99214 OFFICE O/P EST MOD 30 MIN: CPT

## 2025-07-16 ENCOUNTER — TRANSCRIPTION ENCOUNTER (OUTPATIENT)
Age: 45
End: 2025-07-16

## 2025-07-16 LAB
25(OH)D3 SERPL-MCNC: 36.3 NG/ML
ALBUMIN SERPL ELPH-MCNC: 4.4 G/DL
ALP BLD-CCNC: 53 U/L
ALT SERPL-CCNC: 30 U/L
ANION GAP SERPL CALC-SCNC: 15 MMOL/L
AST SERPL-CCNC: 32 U/L
BILIRUB SERPL-MCNC: 0.4 MG/DL
BUN SERPL-MCNC: 14 MG/DL
CALCIUM SERPL-MCNC: 9.4 MG/DL
CHLORIDE SERPL-SCNC: 102 MMOL/L
CHOLEST SERPL-MCNC: 198 MG/DL
CO2 SERPL-SCNC: 21 MMOL/L
CREAT SERPL-MCNC: 0.63 MG/DL
EGFRCR SERPLBLD CKD-EPI 2021: 112 ML/MIN/1.73M2
ESTIMATED AVERAGE GLUCOSE: 108 MG/DL
GLUCOSE SERPL-MCNC: 81 MG/DL
HBA1C MFR BLD HPLC: 5.4 %
HDLC SERPL-MCNC: 73 MG/DL
LDLC SERPL-MCNC: 111 MG/DL
NONHDLC SERPL-MCNC: 125 MG/DL
POTASSIUM SERPL-SCNC: 4.2 MMOL/L
PROT SERPL-MCNC: 7 G/DL
SODIUM SERPL-SCNC: 137 MMOL/L
TRIGL SERPL-MCNC: 77 MG/DL

## 2025-07-21 ENCOUNTER — RX RENEWAL (OUTPATIENT)
Age: 45
End: 2025-07-21

## 2025-08-07 ENCOUNTER — APPOINTMENT (OUTPATIENT)
Dept: ORTHOPEDIC SURGERY | Facility: CLINIC | Age: 45
End: 2025-08-07

## 2025-08-07 VITALS — WEIGHT: 150 LBS | HEIGHT: 61 IN | BODY MASS INDEX: 28.32 KG/M2

## 2025-08-07 PROCEDURE — 99203 OFFICE O/P NEW LOW 30 MIN: CPT

## 2025-08-07 PROCEDURE — 73564 X-RAY EXAM KNEE 4 OR MORE: CPT | Mod: LT
